# Patient Record
(demographics unavailable — no encounter records)

---

## 2025-01-20 NOTE — PHYSICAL EXAM
[Bilateral] : rib bilaterally [Fracture] : Fracture [] : negative sitting straight leg raise [FreeTextEntry1] : multiple compression fractures

## 2025-01-20 NOTE — ASSESSMENT
[FreeTextEntry1] : Xrays reviewed with patient Treatment options discussed MDP sent for pain and inflammation TODAY MRI thoracic spine to eval acute compression fractures Follow up with spine specialist to review MRI

## 2025-01-20 NOTE — HISTORY OF PRESENT ILLNESS
[Mid-back] : mid-back [8] : 8 [6] : 6 [Dull/Aching] : dull/aching [Localized] : localized [Sharp] : sharp [Constant] : constant [Full time] : Work status: full time [de-identified] : 01/20/2025: Patient is a 64 yo female c/o low/mid back pain for 4 days when she was getting out of bed and felt a "crack." Hx of osteoporosis. Taking advil and tylenol as needed for pain. Hx of T4, T5, T8, T12 compression fractures. No previous surgeries to low/mid back.  [] : Post Surgical Visit: no [FreeTextEntry3] : 1/16/25 [FreeTextEntry5] : Patient complaining of back pain since 1/16/25 no injury, state she felt a pop in her back as she was turning in bed. no prior hx

## 2025-01-23 NOTE — HISTORY OF PRESENT ILLNESS
[3] : 3 [Dull/Aching] : dull/aching [Constant] : constant [Sleep] : sleep [Meds] : meds [de-identified] : 1.22.25:  62 yo F - Patient here for upper and middle back pain. NC from the urgent care - on 1/11/25 she got up and felt a crack in her back - feels like a muscle clenching in her back - persistent soreness - cant get comfortable   Had episode of compression fractures in aug 2024 that slowly got better over time - initially she rested then she did PT  she treated with rest  No relief with brace  on steroids and nsaids without relief   PMH: HLD  osteoporosis  5x stents - on aspirin No hx of cancer   semi-retired teacher   xrays reviewed: T spine - multiple compression deformities noted  MRi T spine - see report [facility] 8/21/24 -1.  Multiple acute to subacute compression fractures of T8, T5, T4, and T12 inferior endplate. While these may be osteoporotic compression injuries, pathologic compression fractures for malignancy cannot be entirely excluded. Clinical correlation recommended. 2.  No canal or foraminal stenosis at any thoracic level.   new mri T spine 1/20/25 [facility]  - marrow edema noted in T10, T8, T6 lesser involvement in other levels   DEXA STUDY - DEXA - AP Lumbar spine L1 T-score -3.3. Z-score -1.6 Left femoral neck T-score -2.0. Z-score -0.5. Left total hip T-score -1.8. Z-score -0.5. Right femoral neck T-score -1.6. Z-score -0.1. Right total hip T-score -1.8. Z-score -0.6. Left radius 1/3 site T-score -2.6. Z-score -1.4. No gross lumbar spine structural abnormality. No gross hip structural abnormality. No forearm structural abnormality. COMPARISON: Comparison is made to prior DXA examination from 2/28/2022. Lumbar spine: -14.7% change in bone mineral density , which represents a statistically significant change.   Mean total hip: No statistically significant change in bone mineral density IMPRESSION:  According to the WHO classification, the patient has osteoporosis.   Female